# Patient Record
Sex: FEMALE | Race: WHITE | HISPANIC OR LATINO | Employment: PART TIME | URBAN - METROPOLITAN AREA
[De-identification: names, ages, dates, MRNs, and addresses within clinical notes are randomized per-mention and may not be internally consistent; named-entity substitution may affect disease eponyms.]

---

## 2023-05-31 ENCOUNTER — TELEPHONE (OUTPATIENT)
Dept: OBSTETRICS AND GYNECOLOGY | Facility: CLINIC | Age: 28
End: 2023-05-31
Payer: MEDICAID

## 2023-06-09 ENCOUNTER — TELEPHONE (OUTPATIENT)
Dept: OBSTETRICS AND GYNECOLOGY | Facility: CLINIC | Age: 28
End: 2023-06-09
Payer: MEDICAID

## 2023-06-09 NOTE — TELEPHONE ENCOUNTER
----- Message from Meri Mike sent at 6/9/2023 10:37 AM CDT -----  Regarding: return call  Contact: patient  PT missed call about getting scheduled for an appt, return call [phone]

## 2023-06-09 NOTE — TELEPHONE ENCOUNTER
----- Message from Meri Mike sent at 6/9/2023  2:44 PM CDT -----  Regarding: appt  Contact: patient  PT returning call to office to be scheduled, return call [phone]

## 2023-06-16 ENCOUNTER — TELEPHONE (OUTPATIENT)
Dept: OBSTETRICS AND GYNECOLOGY | Facility: CLINIC | Age: 28
End: 2023-06-16
Payer: MEDICAID

## 2023-06-16 ENCOUNTER — TELEPHONE (OUTPATIENT)
Dept: OBSTETRICS AND GYNECOLOGY | Facility: CLINIC | Age: 28
End: 2023-06-16

## 2023-06-16 NOTE — TELEPHONE ENCOUNTER
----- Message from Pamela Talley sent at 6/16/2023  1:31 PM CDT -----  Contact: self  Type:  Patient Returning Call    Who Called:pt  Who Left Message for Patient:henrry  Does the patient know what this is regarding?:n/a  Would the patient rather a call back or a response via [facility]? call  Best Call Back Number:[phone]   Additional Information: n/a

## 2023-06-16 NOTE — TELEPHONE ENCOUNTER
----- Message from Radha Bowens sent at 6/16/2023  4:44 PM CDT -----  Contact: self  Type:  Patient Returning Call    Who Called:Shari Florentino  Who Left Message for Patient:shari  Does the patient know what this is regarding?:appt  Would the patient rather a call back or a response via [facility]? Call back  Best Call Back Number:[phone]  Additional Information: pt is off WED/Thurs of next week if an apt is available

## 2023-06-16 NOTE — TELEPHONE ENCOUNTER
Phone call returned to patient  No answer. I left a message on patient's voicemail  We will attempt to reach her on Monday morning.

## 2023-06-16 NOTE — TELEPHONE ENCOUNTER
----- Message from Cynthia Rodriguez sent at 6/16/2023  1:16 PM CDT -----  Regarding: appt access  Contact: pt  Pt is calling to schedule a now ob appt. LMP end of December. Positive preg test. Pt states that she is about 5 months. Please call back at [phone]//thank you acc

## 2023-06-19 ENCOUNTER — TELEPHONE (OUTPATIENT)
Dept: OBSTETRICS AND GYNECOLOGY | Facility: CLINIC | Age: 28
End: 2023-06-19
Payer: MEDICAID

## 2023-06-21 ENCOUNTER — PROCEDURE VISIT (OUTPATIENT)
Dept: OBSTETRICS AND GYNECOLOGY | Facility: CLINIC | Age: 28
End: 2023-06-21
Payer: MEDICAID

## 2023-06-21 DIAGNOSIS — Z34.81 ENCOUNTER FOR SUPERVISION OF NORMAL PREGNANCY IN MULTIGRAVIDA IN FIRST TRIMESTER: ICD-10-CM

## 2023-06-21 DIAGNOSIS — Z34.81 ENCOUNTER FOR SUPERVISION OF NORMAL PREGNANCY IN MULTIGRAVIDA IN FIRST TRIMESTER: Primary | ICD-10-CM

## 2023-06-21 PROCEDURE — 76805 US OB/GYN PROCEDURE (VIEWPOINT): ICD-10-PCS | Mod: S$GLB,,, | Performed by: STUDENT IN AN ORGANIZED HEALTH CARE EDUCATION/TRAINING PROGRAM

## 2023-06-21 PROCEDURE — 76805 OB US >/= 14 WKS SNGL FETUS: CPT | Mod: S$GLB,,, | Performed by: STUDENT IN AN ORGANIZED HEALTH CARE EDUCATION/TRAINING PROGRAM

## 2023-06-29 ENCOUNTER — TELEPHONE (OUTPATIENT)
Dept: OBSTETRICS AND GYNECOLOGY | Facility: CLINIC | Age: 28
End: 2023-06-29
Payer: MEDICAID

## 2023-07-11 ENCOUNTER — ROUTINE PRENATAL (OUTPATIENT)
Dept: OBSTETRICS AND GYNECOLOGY | Facility: CLINIC | Age: 28
End: 2023-07-11
Payer: MEDICAID

## 2023-07-11 VITALS — DIASTOLIC BLOOD PRESSURE: 79 MMHG | SYSTOLIC BLOOD PRESSURE: 114 MMHG | WEIGHT: 149 LBS | HEART RATE: 108 BPM

## 2023-07-11 DIAGNOSIS — Z3A.26 26 WEEKS GESTATION OF PREGNANCY: ICD-10-CM

## 2023-07-11 DIAGNOSIS — O09.30 LATE PRENATAL CARE: ICD-10-CM

## 2023-07-11 DIAGNOSIS — Z34.82 ENCOUNTER FOR SUPERVISION OF OTHER NORMAL PREGNANCY IN SECOND TRIMESTER: Primary | ICD-10-CM

## 2023-07-11 DIAGNOSIS — O09.299 HX OF PREECLAMPSIA, PRIOR PREGNANCY, CURRENTLY PREGNANT: ICD-10-CM

## 2023-07-11 PROBLEM — Z34.92 ENCOUNTER FOR SUPERVISION OF NORMAL PREGNANCY IN SECOND TRIMESTER: Status: ACTIVE | Noted: 2023-07-11

## 2023-07-11 PROCEDURE — 99203 OFFICE O/P NEW LOW 30 MIN: CPT | Mod: TH,S$GLB,, | Performed by: STUDENT IN AN ORGANIZED HEALTH CARE EDUCATION/TRAINING PROGRAM

## 2023-07-11 PROCEDURE — 99203 PR OFFICE/OUTPT VISIT, NEW, LEVL III, 30-44 MIN: ICD-10-PCS | Mod: TH,S$GLB,, | Performed by: STUDENT IN AN ORGANIZED HEALTH CARE EDUCATION/TRAINING PROGRAM

## 2023-07-11 NOTE — PROGRESS NOTES
CC: Initial OB visit    HPI:  28 y.o. at 26w0d with EDC of 10/17/2023, by 23w Ultrasound.  Complains of nothing today.    ROS:  Negative unless mentioned above    PMH: ADHD  PSH: cholecystectomy  Meds: none  ALL: NKDA   OB Hx: -3:  x2, PreE with the last, SAB  G4: current   SOC: denies S/E/D   FH: denies family history of congenital defects, mental retardation, twins, cystic fibrosis, Down's syndrome, sickle cell, NTD's, cleft lip/palate, cardiac defects, abdominal wall defects, GYN cancers   GYN Hx: no past history STD's,  Negative History of HSV,  Negative History of Abnormal PAP    PHYSICAL EXAM  Prenatal Vitals  BP: 114/79  Weight: 67.6 kg (149 lb)    There is no height or weight on file to calculate BMI.    Wt Readings from Last 3 Encounters:   23 67.6 kg (149 lb)       General: NAD, well developed, well nourished  Psych: alert and oriented to person, time and place, normal affect  HEENT: normocephalic, atraumatic  Gravid, soft, NT  Skin: warm, dry  Neuro: normal gait, gross motor function intact  Pelvic: NEFG. Normal vaginal mucosa, pink/ruggated, no lesions or discharge. Cvx closed, nonfriable  No cyanosis, clubbing or edema    FHT's: +    ASSESSMENT: Patient is a 28 y.o.  at 26w0d with  Patient Active Problem List   Diagnosis    Encounter for supervision of normal pregnancy in second trimester    Late prenatal care    Hx of preeclampsia, prior pregnancy, currently pregnant       PLAN:  Prenatal labs, Baseline PreE labs, NIPT today  Osull today  Pt to start ASA 81 mg daily  PNL, GC/CZ, PAP  PNV, Iron  Pain, Fever, Bleeding Precautions  BIJAL, SIRI, PreE precautions  Encouraged Breast feeding  F/U in 4 weeks

## 2023-07-13 LAB
CHLAMYDIA: NEGATIVE
GONORRHEA: NEGATIVE
SOURCE: NORMAL

## 2023-07-17 LAB
SOURCE: NORMAL
TRICHOMONAS AMPLIFIED: NEGATIVE

## 2023-07-21 PROBLEM — R87.810 ASCUS WITH POSITIVE HIGH RISK HPV CERVICAL: Status: ACTIVE | Noted: 2023-07-21

## 2023-07-21 PROBLEM — R87.610 ASCUS WITH POSITIVE HIGH RISK HPV CERVICAL: Status: ACTIVE | Noted: 2023-07-21

## 2023-07-21 LAB — Lab: NORMAL

## 2023-07-25 ENCOUNTER — PATIENT MESSAGE (OUTPATIENT)
Dept: OTHER | Facility: OTHER | Age: 28
End: 2023-07-25
Payer: MEDICAID

## 2023-08-17 ENCOUNTER — ROUTINE PRENATAL (OUTPATIENT)
Dept: OBSTETRICS AND GYNECOLOGY | Facility: CLINIC | Age: 28
End: 2023-08-17
Payer: MEDICAID

## 2023-08-17 VITALS — SYSTOLIC BLOOD PRESSURE: 112 MMHG | WEIGHT: 151 LBS | HEART RATE: 105 BPM | DIASTOLIC BLOOD PRESSURE: 74 MMHG

## 2023-08-17 DIAGNOSIS — Z34.83 ENCOUNTER FOR SUPERVISION OF OTHER NORMAL PREGNANCY IN THIRD TRIMESTER: Primary | ICD-10-CM

## 2023-08-17 DIAGNOSIS — Z91.199 NON-COMPLIANCE: ICD-10-CM

## 2023-08-17 DIAGNOSIS — Z3A.31 31 WEEKS GESTATION OF PREGNANCY: ICD-10-CM

## 2023-08-17 PROCEDURE — 99213 PR OFFICE/OUTPT VISIT, EST, LEVL III, 20-29 MIN: ICD-10-PCS | Mod: TH,S$GLB,, | Performed by: STUDENT IN AN ORGANIZED HEALTH CARE EDUCATION/TRAINING PROGRAM

## 2023-08-17 PROCEDURE — 99213 OFFICE O/P EST LOW 20 MIN: CPT | Mod: TH,S$GLB,, | Performed by: STUDENT IN AN ORGANIZED HEALTH CARE EDUCATION/TRAINING PROGRAM

## 2023-08-17 NOTE — PROGRESS NOTES
CC: Follow-Up OB    HPI:   28 y.o.  at 31w2d with EDC of 10/17/2023, by Ultrasound, here for her follow up OB visit. Complains of nothing today.    Pregnancy ROS:  Positive fetal movement   Negative leakage of fluid   Negative vaginal bleeding   Negative headache, vision changes, RUQ pain, epigastric pain  Negative contractions/abdominal pain   Negative pelvic pressure     Physical Exam:  Prenatal Vitals  BP: 112/74  Weight: 68.5 kg (151 lb)    Wt Readings from Last 3 Encounters:   23 68.5 kg (151 lb)   23 67.6 kg (149 lb)     There is no height or weight on file to calculate BMI.    General: NAD, well developed, well nourished  Psych: alert and oriented to person, time and place, normal affect  HEENT: normocephalic, atraumatic  Abd: Gravid, soft, NT, ND  Skin: warm, dry  Neuro: normal gait, gross motor function intact  No cyanosis, clubbing or edema    FHTs: +    ASSESSMENT: 28 y.o.  @ 31w2d with   Patient Active Problem List   Diagnosis    Encounter for supervision of normal pregnancy in second trimester    Late prenatal care    Hx of preeclampsia, prior pregnancy, currently pregnant    ASCUS with positive high risk HPV cervical       PLAN:  Prenatal labs, Baseline PreE labs today  Osull today  Pt counseled on importance of keeping her appts and getting her labs   Pain, fever, bleeding precautions  SIRI APPIAH, PreE precautions  Cont PNV, Iron  Return to clinic in 2 weeks       - - -

## 2023-08-18 LAB
ABS NRBC COUNT: 0 X 10 3/UL (ref 0–0.01)
ABSOLUTE BASOPHIL: 0.05 X 10 3/UL (ref 0–0.22)
ABSOLUTE EOSINOPHIL: 0.09 X 10 3/UL (ref 0.04–0.54)
ABSOLUTE IMMATURE GRAN: 0.3 X 10 3/UL (ref 0–0.04)
ABSOLUTE LYMPHOCYTE: 1.53 X 10 3/UL (ref 0.86–4.75)
ABSOLUTE MONOCYTE: 0.72 X 10 3/UL (ref 0.22–1.08)
ALBUMIN SERPL-MCNC: 3.3 G/DL (ref 3.5–5.2)
ALBUMIN/GLOB SERPL ELPH: 1.1 {RATIO} (ref 1–2.7)
ALP ISOS SERPL LEV INH-CCNC: 190 U/L (ref 35–105)
ALT (SGPT): 13 U/L (ref 0–33)
AMPHETAMINES (500): NEGATIVE NG/ML
ANION GAP SERPL CALC-SCNC: 10 MMOL/L (ref 8–17)
ANTIBODY SCREEN: NEGATIVE
AST SERPL-CCNC: 18 U/L (ref 0–32)
BARBITURATES (200): NEGATIVE NG/ML
BASOPHILS NFR BLD: 0.4 % (ref 0.2–1.2)
BENZODIAZEPINES: NEGATIVE NG/ML
BILIRUBIN, TOTAL: 0.21 MG/DL (ref 0–1.2)
BLOOD GROUPING: NORMAL
BLOOD TYPE (D): POSITIVE
BUN/CREAT SERPL: 9.4 (ref 6–20)
CALCIUM SERPL-MCNC: 8.6 MG/DL (ref 8.6–10.2)
CARBON DIOXIDE, CO2: 22 MMOL/L (ref 22–29)
CHLORIDE: 108 MMOL/L (ref 98–107)
COCAINE (150): NEGATIVE NG/ML
CREAT SERPL-MCNC: 0.53 MG/DL (ref 0.5–0.9)
CREATININE RANDOM URINE: 60.9 MG/DL (ref 28–217)
EOSINOPHIL NFR BLD: 0.8 % (ref 0.7–7)
GFR ESTIMATION: 111.01 ML/MIN/1.73M2
GLOBULIN: 2.9 G/DL (ref 1.5–4.5)
GLUCOSE 1 HR POST 50 GM: 120 MG/DL
GLUCOSE: ABNORMAL
HBV SURFACE AG SERPL QL IA: NONREACTIVE
HCT VFR BLD AUTO: 33 % (ref 37–47)
HCV IGG SERPL QL IA: NONREACTIVE
HGB BLD-MCNC: 10.7 G/DL (ref 12–16)
HIV 1+2 AB+HIV1 P24 AG SERPL QL IA: NONREACTIVE
IMMATURE GRANULOCYTES: 2.5 % (ref 0–0.5)
LDH SERPL L TO P-CCNC: 185 U/L (ref 135–214)
LYMPHOCYTES NFR BLD: 12.9 % (ref 19.3–53.1)
MARIJUANA, THC (50): NEGATIVE NG/ML
MCH RBC QN AUTO: 27.4 PG (ref 27–32)
MCHC RBC AUTO-ENTMCNC: 32.4 G/DL (ref 32–36)
MCV RBC AUTO: 84.6 FL (ref 82–100)
METHADONE: NEGATIVE NG/ML
MONOCYTES NFR BLD: 6.1 % (ref 4.7–12.5)
NEUTROPHILS # BLD AUTO: 9.16 X 10 3/UL (ref 2.15–7.56)
NEUTROPHILS NFR BLD: 77.3 % (ref 34–71.1)
NUCLEATED RED BLOOD CELLS: 0 /100 WBC (ref 0–0.2)
OPIATES: NEGATIVE NG/ML
OXYCODONE: NEGATIVE NG/ML
PH: 5.9 (ref 4.5–8)
PHENCYCLIDINE (25): NEGATIVE NG/ML
PLATELET # BLD AUTO: 279 X 10 3/UL (ref 135–400)
POTASSIUM: 3.5 MMOL/L (ref 3.5–5.1)
PROT SNV-MCNC: 6.2 G/DL (ref 6.4–8.3)
PROT UR QL STRIP: 8.49 MG/DL (ref 5–24)
PROTEIN/CREATININE RATIO: 0.14
RBC # BLD AUTO: 3.9 X 10 6/UL (ref 4.2–5.4)
RDW-SD: 37.1 FL (ref 37–54)
RUBELLA IGG SCREEN: NORMAL
SICKLE CELL PREP: NEGATIVE
SODIUM: 140 MMOL/L (ref 136–145)
SYPHILIS TREPONEMAL ANTIBODY: NONREACTIVE
URATE SERPL-MCNC: 3.6 MG/DL (ref 2.4–5.7)
UREA NITROGEN (BUN): 5 MG/DL (ref 6–20)
URINE CREATININE D/S: 152.1 MG/DL
URINE CULTURE, ROUTINE: NORMAL
WBC # BLD: 11.85 X 10 3/UL (ref 4.3–10.8)

## 2023-08-22 ENCOUNTER — PATIENT MESSAGE (OUTPATIENT)
Dept: OTHER | Facility: OTHER | Age: 28
End: 2023-08-22
Payer: MEDICAID

## 2023-09-07 ENCOUNTER — ROUTINE PRENATAL (OUTPATIENT)
Dept: OBSTETRICS AND GYNECOLOGY | Facility: CLINIC | Age: 28
End: 2023-09-07
Payer: MEDICAID

## 2023-09-07 VITALS — SYSTOLIC BLOOD PRESSURE: 132 MMHG | HEART RATE: 105 BPM | WEIGHT: 158 LBS | DIASTOLIC BLOOD PRESSURE: 78 MMHG

## 2023-09-07 DIAGNOSIS — Z34.83 ENCOUNTER FOR SUPERVISION OF OTHER NORMAL PREGNANCY IN THIRD TRIMESTER: Primary | ICD-10-CM

## 2023-09-07 DIAGNOSIS — Z3A.34 34 WEEKS GESTATION OF PREGNANCY: ICD-10-CM

## 2023-09-07 PROCEDURE — 99213 OFFICE O/P EST LOW 20 MIN: CPT | Mod: TH,S$GLB,, | Performed by: STUDENT IN AN ORGANIZED HEALTH CARE EDUCATION/TRAINING PROGRAM

## 2023-09-07 PROCEDURE — 99213 PR OFFICE/OUTPT VISIT, EST, LEVL III, 20-29 MIN: ICD-10-PCS | Mod: TH,S$GLB,, | Performed by: STUDENT IN AN ORGANIZED HEALTH CARE EDUCATION/TRAINING PROGRAM

## 2023-09-07 NOTE — PROGRESS NOTES
CC: Follow-Up OB    HPI:   28 y.o.  at 34w2d with EDC of 10/17/2023, by Ultrasound, here for her follow up OB visit. Complains of nothing today.    Pregnancy ROS:  Positive fetal movement   Negative leakage of fluid   Negative vaginal bleeding   Negative headache, vision changes, RUQ pain, epigastric pain  Negative contractions/abdominal pain   Negative pelvic pressure     Physical Exam:  Prenatal Vitals  BP: 132/78  Weight: 71.7 kg (158 lb)    Wt Readings from Last 3 Encounters:   23 71.7 kg (158 lb)   23 68.5 kg (151 lb)   23 67.6 kg (149 lb)     There is no height or weight on file to calculate BMI.    General: NAD, well developed, well nourished  Psych: alert and oriented to person, time and place, normal affect  HEENT: normocephalic, atraumatic  Abd: Gravid, soft, NT, ND  Skin: warm, dry  Neuro: normal gait, gross motor function intact  No cyanosis, clubbing or edema    FHTs: +    Maternal Blood Type: O+/-    ASSESSMENT: 28 y.o.  @ 34w2d with   Patient Active Problem List   Diagnosis    Encounter for supervision of normal pregnancy in second trimester    Late prenatal care    Hx of preeclampsia, prior pregnancy, currently pregnant    ASCUS with positive high risk HPV cervical       PLAN:  PNL reviewed  Osull wnl  GBS on RTC  Pain, fever, bleeding precautions  BIJAL, SIRI, PreE precautions  Cont PNV, Iron  Return to clinic in 2 weeks

## 2023-09-08 ENCOUNTER — PATIENT MESSAGE (OUTPATIENT)
Dept: OBSTETRICS AND GYNECOLOGY | Facility: CLINIC | Age: 28
End: 2023-09-08
Payer: MEDICAID

## 2023-09-12 ENCOUNTER — PATIENT MESSAGE (OUTPATIENT)
Dept: OTHER | Facility: OTHER | Age: 28
End: 2023-09-12
Payer: MEDICAID

## 2023-09-19 ENCOUNTER — ROUTINE PRENATAL (OUTPATIENT)
Dept: OBSTETRICS AND GYNECOLOGY | Facility: CLINIC | Age: 28
End: 2023-09-19
Payer: MEDICAID

## 2023-09-19 VITALS — DIASTOLIC BLOOD PRESSURE: 81 MMHG | HEART RATE: 112 BPM | WEIGHT: 164 LBS | SYSTOLIC BLOOD PRESSURE: 116 MMHG

## 2023-09-19 DIAGNOSIS — Z3A.36 36 WEEKS GESTATION OF PREGNANCY: ICD-10-CM

## 2023-09-19 DIAGNOSIS — Z34.83 ENCOUNTER FOR SUPERVISION OF OTHER NORMAL PREGNANCY IN THIRD TRIMESTER: Primary | ICD-10-CM

## 2023-09-19 PROCEDURE — 99213 OFFICE O/P EST LOW 20 MIN: CPT | Mod: TH,S$GLB,, | Performed by: STUDENT IN AN ORGANIZED HEALTH CARE EDUCATION/TRAINING PROGRAM

## 2023-09-19 PROCEDURE — 99213 PR OFFICE/OUTPT VISIT, EST, LEVL III, 20-29 MIN: ICD-10-PCS | Mod: TH,S$GLB,, | Performed by: STUDENT IN AN ORGANIZED HEALTH CARE EDUCATION/TRAINING PROGRAM

## 2023-09-19 NOTE — PROGRESS NOTES
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 Blood cultures (9/19 - 9/26) have grown methicillin sensitive Staph aureus. Blood cultures as of 9/29 have been NGTD. mitral and tricuspid valve vegetations seen on DHARMESH 9/25; unchanged on TTE 9/28. OSH abx regimen vanc, ancef and daptomycin. Repeat BCx positive for MSSA. OSH PICC line d/c'd.    --as of 9/29, blood cultures are NGTD  --formal 2D echo results show EF 53%; normal L ventricular function; mild mitral sclerosis; small circumferential pericardial effusion. cardiology consulted; no surgical intervention at this point  --followed by neurosurgery; OR laminectomy complete; OR note to follow  --bilateral shoulder abscess, s/p I&D; R shoulder abscess + staph aureus   --oxacillin started after passed penicillin challenge; ID recs to continue oxacillin x 6 weeks     CC: Follow-Up OB    HPI:   28 y.o.  at 36w0d with EDC of 10/17/2023, by Ultrasound, here for her follow up OB visit. Complains of reflux, taking tums, rolaids with little relief. .    Pregnancy ROS:  Positive fetal movement   Negative leakage of fluid   Negative vaginal bleeding   Negative headache, vision changes, RUQ pain, epigastric pain  Negative contractions/abdominal pain   Negative pelvic pressure     Physical Exam:  Prenatal Vitals  BP: 116/81  Weight: 74.4 kg (164 lb)    Wt Readings from Last 3 Encounters:   23 74.4 kg (164 lb)   23 71.7 kg (158 lb)   23 68.5 kg (151 lb)       There is no height or weight on file to calculate BMI.    General: NAD, well developed, well nourished  Psych: alert and oriented to person, time and place, normal affect  HEENT: normocephalic, atraumatic  Abd: Gravid, soft, NT, ND  Skin: warm, dry  Neuro: normal gait, gross motor function intact  Pelvic: NEFG. Cvx 1-2/hi/th  No cyanosis, clubbing or edema    FHTs: +    Maternal Blood Type: O+/-    ASSESSMENT: 28 y.o.  @ 36w0d with   Patient Active Problem List   Diagnosis    Encounter for supervision of normal pregnancy in second trimester    Late prenatal care    Hx of preeclampsia, prior pregnancy, currently pregnant    ASCUS with positive high risk HPV cervical       PLAN:  GBS today  IOL scheduled for 10/10 with pit  Pt counseled on taking pepcid BID  Pain, fever, bleeding precautions  BIJAL, SIRI, PreE precautions  Cont PNV, Iron  Return to clinic in 1 weeks

## 2023-09-22 LAB
GROUP B STREP MOLECULAR: NEGATIVE
PENICILLIN ALLERGIC: NO

## 2023-09-26 ENCOUNTER — ROUTINE PRENATAL (OUTPATIENT)
Dept: OBSTETRICS AND GYNECOLOGY | Facility: CLINIC | Age: 28
End: 2023-09-26
Payer: MEDICAID

## 2023-09-26 VITALS — SYSTOLIC BLOOD PRESSURE: 122 MMHG | DIASTOLIC BLOOD PRESSURE: 84 MMHG | WEIGHT: 166 LBS | HEART RATE: 108 BPM

## 2023-09-26 DIAGNOSIS — Z34.83 ENCOUNTER FOR SUPERVISION OF OTHER NORMAL PREGNANCY IN THIRD TRIMESTER: Primary | ICD-10-CM

## 2023-09-26 DIAGNOSIS — Z3A.37 37 WEEKS GESTATION OF PREGNANCY: ICD-10-CM

## 2023-09-26 PROCEDURE — 99213 PR OFFICE/OUTPT VISIT, EST, LEVL III, 20-29 MIN: ICD-10-PCS | Mod: TH,S$GLB,, | Performed by: STUDENT IN AN ORGANIZED HEALTH CARE EDUCATION/TRAINING PROGRAM

## 2023-09-26 PROCEDURE — 99213 OFFICE O/P EST LOW 20 MIN: CPT | Mod: TH,S$GLB,, | Performed by: STUDENT IN AN ORGANIZED HEALTH CARE EDUCATION/TRAINING PROGRAM

## 2023-09-26 NOTE — PROGRESS NOTES
CC: Follow-Up OB    HPI:   28 y.o.  at 37w0d with EDC of 10/17/2023, by Ultrasound, here for her follow up OB visit. Complains of nothing today.    Pregnancy ROS:  Positive fetal movement   Negative leakage of fluid   Negative vaginal bleeding   Negative headache, vision changes, RUQ pain, epigastric pain  Negative contractions/abdominal pain   Negative pelvic pressure     Physical Exam:  Prenatal Vitals  BP: 122/84  Weight: 75.3 kg (166 lb)    Wt Readings from Last 3 Encounters:   23 75.3 kg (166 lb)   23 74.4 kg (164 lb)   23 71.7 kg (158 lb)       There is no height or weight on file to calculate BMI.    General: NAD, well developed, well nourished  Psych: alert and oriented to person, time and place, normal affect  HEENT: normocephalic, atraumatic  Abd: Gravid, soft, NT, ND  Skin: warm, dry  Neuro: normal gait, gross motor function intact  Pelvic: NEFG. Cvx /hi  No cyanosis, clubbing or edema    FHTs: +    Maternal Blood Type: O+/-    ASSESSMENT: 28 y.o.  @ 37w0d with   Patient Active Problem List   Diagnosis    Encounter for supervision of normal pregnancy in second trimester    Late prenatal care    Hx of preeclampsia, prior pregnancy, currently pregnant    ASCUS with positive high risk HPV cervical     PLAN:  GBS negative  IOL scheduled for 10/10 with pit  Pain, fever, bleeding precautions  BIJAL, SIRI, PreE precautions  Cont PNV, Iron  Return to clinic in 1 weeks

## 2023-09-28 ENCOUNTER — PATIENT MESSAGE (OUTPATIENT)
Dept: OBSTETRICS AND GYNECOLOGY | Facility: CLINIC | Age: 28
End: 2023-09-28
Payer: MEDICAID

## 2023-10-02 ENCOUNTER — ROUTINE PRENATAL (OUTPATIENT)
Dept: OBSTETRICS AND GYNECOLOGY | Facility: CLINIC | Age: 28
End: 2023-10-02
Payer: MEDICAID

## 2023-10-02 VITALS — SYSTOLIC BLOOD PRESSURE: 117 MMHG | HEART RATE: 111 BPM | DIASTOLIC BLOOD PRESSURE: 75 MMHG | WEIGHT: 164 LBS

## 2023-10-02 DIAGNOSIS — Z34.83 ENCOUNTER FOR SUPERVISION OF OTHER NORMAL PREGNANCY IN THIRD TRIMESTER: Primary | ICD-10-CM

## 2023-10-02 DIAGNOSIS — Z3A.37 37 WEEKS GESTATION OF PREGNANCY: ICD-10-CM

## 2023-10-02 PROCEDURE — 90471 IMMUNIZATION ADMIN: CPT | Mod: S$GLB,,, | Performed by: STUDENT IN AN ORGANIZED HEALTH CARE EDUCATION/TRAINING PROGRAM

## 2023-10-02 PROCEDURE — 99213 OFFICE O/P EST LOW 20 MIN: CPT | Mod: TH,25,S$GLB, | Performed by: STUDENT IN AN ORGANIZED HEALTH CARE EDUCATION/TRAINING PROGRAM

## 2023-10-02 PROCEDURE — 90686 IIV4 VACC NO PRSV 0.5 ML IM: CPT | Mod: S$GLB,,, | Performed by: STUDENT IN AN ORGANIZED HEALTH CARE EDUCATION/TRAINING PROGRAM

## 2023-10-02 PROCEDURE — 99213 PR OFFICE/OUTPT VISIT, EST, LEVL III, 20-29 MIN: ICD-10-PCS | Mod: TH,25,S$GLB, | Performed by: STUDENT IN AN ORGANIZED HEALTH CARE EDUCATION/TRAINING PROGRAM

## 2023-10-02 PROCEDURE — 90471 FLU VACCINE (QUAD) GREATER THAN OR EQUAL TO 3YO PRESERVATIVE FREE IM: ICD-10-PCS | Mod: S$GLB,,, | Performed by: STUDENT IN AN ORGANIZED HEALTH CARE EDUCATION/TRAINING PROGRAM

## 2023-10-02 PROCEDURE — 90686 FLU VACCINE (QUAD) GREATER THAN OR EQUAL TO 3YO PRESERVATIVE FREE IM: ICD-10-PCS | Mod: S$GLB,,, | Performed by: STUDENT IN AN ORGANIZED HEALTH CARE EDUCATION/TRAINING PROGRAM

## 2023-10-02 NOTE — PROGRESS NOTES
CC: Follow-Up OB    HPI:   28 y.o.  at 37w6d with EDC of 10/17/2023, by Ultrasound, here for her follow up OB visit. Complains of nothing today.    Pregnancy ROS:  Positive fetal movement   Negative leakage of fluid   Negative vaginal bleeding   Negative headache, vision changes, RUQ pain, epigastric pain  Negative contractions/abdominal pain   Negative pelvic pressure     Physical Exam:  Prenatal Vitals  BP: 117/75  Weight: 74.4 kg (164 lb)    Wt Readings from Last 3 Encounters:   10/02/23 74.4 kg (164 lb)   23 75.3 kg (166 lb)   23 74.4 kg (164 lb)     There is no height or weight on file to calculate BMI.    General: NAD, well developed, well nourished  Psych: alert and oriented to person, time and place, normal affect  HEENT: normocephalic, atraumatic  Abd: Gravid, soft, NT, ND  Skin: warm, dry  Neuro: normal gait, gross motor function intact  Pelvic: NEFG. Cvx 2/50/hi  No cyanosis, clubbing or edema    FHTs: +  In  Maternal Blood Type: O+/-    ASSESSMENT: 28 y.o.  @ 37w6d with   Patient Active Problem List   Diagnosis    Encounter for supervision of normal pregnancy in second trimester    Late prenatal care    Hx of preeclampsia, prior pregnancy, currently pregnant    ASCUS with positive high risk HPV cervical     PLAN:  GBS negative  IOL scheduled for 10/10 with pit  Pain, fever, bleeding precautions  BIJAL, SIRI, PreE precautions  Cont PNV, Iron  Return to clinic in 1 weeks

## 2023-10-09 ENCOUNTER — TELEPHONE (OUTPATIENT)
Dept: OBSTETRICS AND GYNECOLOGY | Facility: CLINIC | Age: 28
End: 2023-10-09
Payer: MEDICAID

## 2023-10-09 NOTE — TELEPHONE ENCOUNTER
Patient states she will not be able to make it to her induction tonight because she has no one to watch her kids. I will let  know and I informed her that we will see her at her upcoming appointment. Pt v/u      ----- Message from Vanessa Perez sent at 10/9/2023  1:53 PM CDT -----  Patient is calling in regards to schedule for tonight...Please call her back at [phone].              Thanks  [email or web address]

## 2023-10-10 ENCOUNTER — OUTSIDE PLACE OF SERVICE (OUTPATIENT)
Dept: OBSTETRICS AND GYNECOLOGY | Facility: CLINIC | Age: 28
End: 2023-10-10
Payer: MEDICAID

## 2023-10-10 PROCEDURE — 0502F SUBSEQUENT PRENATAL CARE: CPT | Mod: ,,, | Performed by: STUDENT IN AN ORGANIZED HEALTH CARE EDUCATION/TRAINING PROGRAM

## 2023-10-10 PROCEDURE — 0502F PR SUBSEQUENT PRENATAL CARE: ICD-10-PCS | Mod: ,,, | Performed by: STUDENT IN AN ORGANIZED HEALTH CARE EDUCATION/TRAINING PROGRAM

## 2023-10-10 PROCEDURE — 59409 PR OBSTETRICAL CARE,VAG DELIV ONLY: ICD-10-PCS | Mod: GB,,, | Performed by: STUDENT IN AN ORGANIZED HEALTH CARE EDUCATION/TRAINING PROGRAM

## 2023-10-10 PROCEDURE — 59409 OBSTETRICAL CARE: CPT | Mod: GB,,, | Performed by: STUDENT IN AN ORGANIZED HEALTH CARE EDUCATION/TRAINING PROGRAM

## 2023-10-11 PROCEDURE — 99232 PR SUBSEQUENT HOSPITAL CARE,LEVL II: ICD-10-PCS | Mod: ,,, | Performed by: STUDENT IN AN ORGANIZED HEALTH CARE EDUCATION/TRAINING PROGRAM

## 2023-10-11 PROCEDURE — 99232 SBSQ HOSP IP/OBS MODERATE 35: CPT | Mod: ,,, | Performed by: STUDENT IN AN ORGANIZED HEALTH CARE EDUCATION/TRAINING PROGRAM

## 2023-10-12 PROCEDURE — 99238 HOSP IP/OBS DSCHRG MGMT 30/<: CPT | Mod: ,,, | Performed by: STUDENT IN AN ORGANIZED HEALTH CARE EDUCATION/TRAINING PROGRAM

## 2023-10-12 PROCEDURE — 99238 PR HOSPITAL DISCHARGE DAY,<30 MIN: ICD-10-PCS | Mod: ,,, | Performed by: STUDENT IN AN ORGANIZED HEALTH CARE EDUCATION/TRAINING PROGRAM
